# Patient Record
Sex: FEMALE | Race: BLACK OR AFRICAN AMERICAN | NOT HISPANIC OR LATINO
[De-identification: names, ages, dates, MRNs, and addresses within clinical notes are randomized per-mention and may not be internally consistent; named-entity substitution may affect disease eponyms.]

---

## 2018-02-05 ENCOUNTER — TRANSCRIPTION ENCOUNTER (OUTPATIENT)
Age: 83
End: 2018-02-05

## 2021-01-11 ENCOUNTER — APPOINTMENT (OUTPATIENT)
Dept: ORTHOPEDIC SURGERY | Facility: CLINIC | Age: 86
End: 2021-01-11

## 2021-01-11 PROBLEM — Z00.00 ENCOUNTER FOR PREVENTIVE HEALTH EXAMINATION: Status: ACTIVE | Noted: 2021-01-11

## 2021-01-12 ENCOUNTER — APPOINTMENT (OUTPATIENT)
Dept: ORTHOPEDIC SURGERY | Facility: CLINIC | Age: 86
End: 2021-01-12
Payer: MEDICARE

## 2021-01-12 VITALS — TEMPERATURE: 91.7 F

## 2021-01-12 DIAGNOSIS — Z82.61 FAMILY HISTORY OF ARTHRITIS: ICD-10-CM

## 2021-01-12 DIAGNOSIS — M54.5 LOW BACK PAIN: ICD-10-CM

## 2021-01-12 DIAGNOSIS — Z85.3 PERSONAL HISTORY OF MALIGNANT NEOPLASM OF BREAST: ICD-10-CM

## 2021-01-12 DIAGNOSIS — Z80.9 FAMILY HISTORY OF MALIGNANT NEOPLASM, UNSPECIFIED: ICD-10-CM

## 2021-01-12 DIAGNOSIS — Z78.9 OTHER SPECIFIED HEALTH STATUS: ICD-10-CM

## 2021-01-12 DIAGNOSIS — Z86.79 PERSONAL HISTORY OF OTHER DISEASES OF THE CIRCULATORY SYSTEM: ICD-10-CM

## 2021-01-12 DIAGNOSIS — M54.16 RADICULOPATHY, LUMBAR REGION: ICD-10-CM

## 2021-01-12 DIAGNOSIS — R25.2 CRAMP AND SPASM: ICD-10-CM

## 2021-01-12 PROCEDURE — 99072 ADDL SUPL MATRL&STAF TM PHE: CPT

## 2021-01-12 PROCEDURE — 99204 OFFICE O/P NEW MOD 45 MIN: CPT

## 2021-01-12 PROCEDURE — 72110 X-RAY EXAM L-2 SPINE 4/>VWS: CPT

## 2021-01-12 RX ORDER — AMLODIPINE BESYLATE 2.5 MG/1
2.5 TABLET ORAL
Qty: 90 | Refills: 0 | Status: ACTIVE | COMMUNITY
Start: 2020-10-09

## 2021-01-12 RX ORDER — LORAZEPAM 1 MG/1
1 TABLET ORAL
Qty: 2 | Refills: 0 | Status: ACTIVE | COMMUNITY
Start: 2021-01-12 | End: 1900-01-01

## 2021-01-12 RX ORDER — HYDROCHLOROTHIAZIDE 25 MG/1
25 TABLET ORAL
Qty: 90 | Refills: 0 | Status: ACTIVE | COMMUNITY
Start: 2020-10-09

## 2021-01-18 NOTE — HISTORY OF PRESENT ILLNESS
[de-identified] : 85 year old female presents with chronic low back pain.  She also complains of significant cramps in her lower extremities.  She says this has progressively worsened over the last few years.  She has difficulty walking and has to sit and rest frequently due to the back and leg pain.  She reports paresthesias in her lower extremities.  She says she takes a natural supplement for leg cramps but cannot recall what it is called.  She says she was told in the past she has vascular dysfunction in her lower extremities but she has not seen a physician for that issue in sometime (her description sounds like PAD).  She denies recent illness, fevers, weakness, balance problems, saddle anesthesia, urinary retention or fecal incontinence.

## 2021-01-18 NOTE — PHYSICAL EXAM
[de-identified] : General: No acute distress, conversant, well-nourished.\par Head: Normocephalic, atraumatic\par Neck: trachea midline, FROM\par Heart: normotensive and normal rate and rhythm\par Lungs: No labored breathing\par Skin: No abrasions, no rashes, no edema\par Psych: Alert and oriented to person, place and time\par Extremities: no peripheral edema or digital cyanosis\par Gait: Normal gait. Can perform tandem gait.  \par Vascular: warm and well perfused distally, difficult to palpate DP and PT pulses in legs\par \par MSK:\par Lumbar spine:\par No tenderness to palpation.  No step-off, no deformity.\par \par NEURO EXAM:\par Sensation \par Left L2  -  2/2            \par Left L3  -  2/2\par Left L4  -  2/2\par Left L5  -  2/2\par Left S1  -  2/2\par \par Right L2  -  2/2            \par Right L3  -  2/2\par Right L4  -  2/2\par Right L5  -  2/2\par Right S1  -  2/2\par \par Motor: \par Left L2 (hip flexion)                            5/5                \par Left L3 (knee extension)                   5/5                \par Left L4 (ankle dorsiflexion)                 5/5                \par Left L5 (long toe extensor)                5/5                \par Left S1 (ankle plantar flexion)           5/5\par \par Right L2 (hip flexion)                            5/5                \par Right L3 (knee extension)                   5/5                \par Right L4 (ankle dorsiflexion)                 5/5                \par Right L5 (long toe extensor)                5/5                \par Right S1 (ankle plantar flexion)           5/5\par \par Reflexes: Normal and symmetric\par Negative clonus.  Down-going Babinski.   [de-identified] : Lumbar AP, lateral, flexion and extension radiographs obtained in the office today shows no fracture or dislocation.  Severe multilevel degenerative changes of the lumbar spine with severe multilevel loss of disc height. Facet arthropathy.  No instability on dynamic series.

## 2021-01-18 NOTE — ASSESSMENT
[FreeTextEntry1] : 85 year old female with chronic low back pain radiating to her lower extremities.  She has paresthesias but is otherwise neurologically intact.  The pain is severely limiting the distance she can walk.  She has tried medications without relief.  She will be sent for a lumbar MRI to evaluate for lumbar stenosis.  She also has a history PAD and was given a referral for vascular consultation through Dr. Caldwell.  She will followup once her MRI has been completed.  She knows to call with any questions or concerns or if her symptoms acutely worsen.

## 2021-01-25 ENCOUNTER — APPOINTMENT (OUTPATIENT)
Dept: VASCULAR SURGERY | Facility: CLINIC | Age: 86
End: 2021-01-25
Payer: MEDICARE

## 2021-01-25 PROCEDURE — 93923 UPR/LXTR ART STDY 3+ LVLS: CPT

## 2021-01-25 PROCEDURE — 99203 OFFICE O/P NEW LOW 30 MIN: CPT

## 2021-01-25 PROCEDURE — 99072 ADDL SUPL MATRL&STAF TM PHE: CPT

## 2021-01-25 NOTE — ADDENDUM
[FreeTextEntry1] : This note was written by Elena Barnes on 01/25/2021 acting as scribe for Maki Tovar M.D.\par \par I, Maki Saldana have read and attest that all the information, medical decision making and discharge instructions within are true and accurate.

## 2021-01-25 NOTE — REVIEW OF SYSTEMS
[As Noted in HPI] : as noted in HPI [Joint Stiffness] : joint stiffness [Limb Pain] : limb pain [Negative] : Heme/Lymph

## 2021-01-25 NOTE — HISTORY OF PRESENT ILLNESS
[FreeTextEntry1] : 86 y/o F with PMH chronic lower back pain presents for initial evaluation of BLE pain. Pt referred by Jamel Chris for evaluation. Pt reports long hx of of LE pain that has worsened over the last years. She has difficulty ambulating due to her back pain and stiffness and has to sit and rest on frequent occasions, she denies any LE claudication denies any recent falls, rest pain.

## 2021-01-25 NOTE — PHYSICAL EXAM
[Respiratory Effort] : normal respiratory effort [Normal Rate and Rhythm] : normal rate and rhythm [2+] : left 2+ [Ankle Swelling (On Exam)] : present [Ankle Swelling Bilaterally] : bilaterally  [Ankle Swelling On The Right] : mild [No Rash or Lesion] : No rash or lesion [Alert] : alert [Oriented to Person] : oriented to person [Oriented to Place] : oriented to place [Oriented to Time] : oriented to time [Calm] : calm [Varicose Veins Of Lower Extremities] : not present [] : not present [de-identified] : Well appearing  [de-identified] : NC/AT  [de-identified] : Supple  [de-identified] : FROM 5/5 x 4.

## 2021-01-25 NOTE — ASSESSMENT
[FreeTextEntry1] : 84 y/o F with chronic back pain presents for initial evaluation of BLE pain. On exam, good strong popliteal, PT, DP pulses bilaterally. Feet are pink, toes are warm to touch with adequate capillary refill. PVR completed today shows no evidence of arterial occlusive disease.  I explained to the pt this symptoms are not not vascular in origin and are likely secondary to radiculopathy pain. No vascular surgery intervention required at this moment. She will continue to follow up with us here PRN.  [Arterial/Venous Disease] : arterial/venous disease

## 2021-09-23 ENCOUNTER — APPOINTMENT (OUTPATIENT)
Dept: PULMONOLOGY | Facility: CLINIC | Age: 86
End: 2021-09-23
Payer: MEDICARE

## 2021-09-23 VITALS
SYSTOLIC BLOOD PRESSURE: 143 MMHG | BODY MASS INDEX: 24.63 KG/M2 | HEART RATE: 70 BPM | TEMPERATURE: 97.7 F | OXYGEN SATURATION: 97 % | HEIGHT: 63 IN | DIASTOLIC BLOOD PRESSURE: 69 MMHG | WEIGHT: 139 LBS

## 2021-09-23 DIAGNOSIS — J45.40 MODERATE PERSISTENT ASTHMA, UNCOMPLICATED: ICD-10-CM

## 2021-09-23 PROCEDURE — 99204 OFFICE O/P NEW MOD 45 MIN: CPT

## 2021-09-23 NOTE — ASSESSMENT
[FreeTextEntry1] : asthma by hx, worsened with dust exposure.  No acute distress, clear lungs. Told her she must discontinue Primatene tablets (ephedrine) because of cardiovascular effects.  Has a fear of inhalers.  Have started on montelukast for now.  Will get pulmonary function testing, if has significant abnormalities will try to convince her to use inhaled medication.

## 2021-09-23 NOTE — HISTORY OF PRESENT ILLNESS
[TextBox_4] : 09/23/2021 :  VAZQUEZ BARFIELD is a 86 year old female who is here for dyspnea.  Former occasional smoker, quit > 20 yrs ago, has had dx of asthma in the past, for past 2 yrs using Primatene tablets (only) for asthma.  She is not willing to use any inhaled medication.  Recently has had increased sx because of construction in her apt bldg with lots of dust.  In past she has been told of allergies to cats (had one years ago) and dust. Feels dyspneic on climbing stairs, able to walk level ground normal pace.  No c/o wheezing or cough. \par \par Hx hypertension on amlodipine and HCTZ, some chronic ankle edema. \par \par Had CXR 9/1/21, report hyperinflation, normal cardiac size

## 2021-09-23 NOTE — PHYSICAL EXAM
[No Acute Distress] : no acute distress [Normal Oropharynx] : normal oropharynx [Normal Appearance] : normal appearance [No Neck Mass] : no neck mass [Normal Rate/Rhythm] : normal rate/rhythm [Normal S1, S2] : normal s1, s2 [No Murmurs] : no murmurs [No Resp Distress] : no resp distress [Clear to Auscultation Bilaterally] : clear to auscultation bilaterally [Kyphosis] : kyphosis [No Clubbing] : no clubbing [No Cyanosis] : no cyanosis [1+ Pitting] : 1+ pitting [Normal Color/ Pigmentation] : normal color/ pigmentation [2+ Pitting] : 2+ pitting [Oriented x3] : oriented x3 [Normal Affect] : normal affect [TextBox_2] : elderly  [TextBox_105] : edema R>L LE

## 2021-09-24 LAB
BASOPHILS # BLD AUTO: 0.05 K/UL
BASOPHILS NFR BLD AUTO: 0.6 %
EOSINOPHIL # BLD AUTO: 0.61 K/UL
EOSINOPHIL # BLD MANUAL: 620 /UL
EOSINOPHIL NFR BLD AUTO: 7.5 %
HCT VFR BLD CALC: 40.3 %
HGB BLD-MCNC: 12.7 G/DL
IMM GRANULOCYTES NFR BLD AUTO: 0.4 %
LYMPHOCYTES # BLD AUTO: 1.44 K/UL
LYMPHOCYTES NFR BLD AUTO: 17.8 %
MAN DIFF?: NORMAL
MCHC RBC-ENTMCNC: 28.5 PG
MCHC RBC-ENTMCNC: 31.5 GM/DL
MCV RBC AUTO: 90.4 FL
MONOCYTES # BLD AUTO: 0.73 K/UL
MONOCYTES NFR BLD AUTO: 9 %
NEUTROPHILS # BLD AUTO: 5.25 K/UL
NEUTROPHILS NFR BLD AUTO: 64.7 %
PLATELET # BLD AUTO: 207 K/UL
RBC # BLD: 4.46 M/UL
RBC # FLD: 13.8 %
WBC # FLD AUTO: 8.11 K/UL

## 2021-09-27 LAB — TOTAL IGE SMQN RAST: 83 KU/L

## 2022-10-17 ENCOUNTER — RX RENEWAL (OUTPATIENT)
Age: 87
End: 2022-10-17

## 2022-10-17 RX ORDER — MONTELUKAST 10 MG/1
10 TABLET, FILM COATED ORAL DAILY
Qty: 30 | Refills: 0 | Status: ACTIVE | COMMUNITY
Start: 2021-09-23 | End: 1900-01-01

## 2025-01-15 ENCOUNTER — NON-APPOINTMENT (OUTPATIENT)
Age: 89
End: 2025-01-15

## 2025-01-17 ENCOUNTER — NON-APPOINTMENT (OUTPATIENT)
Age: 89
End: 2025-01-17

## 2025-05-10 ENCOUNTER — NON-APPOINTMENT (OUTPATIENT)
Age: 89
End: 2025-05-10